# Patient Record
Sex: FEMALE | Race: BLACK OR AFRICAN AMERICAN | Employment: OTHER | URBAN - METROPOLITAN AREA
[De-identification: names, ages, dates, MRNs, and addresses within clinical notes are randomized per-mention and may not be internally consistent; named-entity substitution may affect disease eponyms.]

---

## 2017-02-08 ENCOUNTER — HOSPITAL ENCOUNTER (EMERGENCY)
Age: 32
Discharge: HOME | End: 2017-02-08
Attending: FAMILY MEDICINE

## 2017-02-08 VITALS
OXYGEN SATURATION: 100 % | WEIGHT: 228.6 LBS | SYSTOLIC BLOOD PRESSURE: 138 MMHG | HEIGHT: 62 IN | DIASTOLIC BLOOD PRESSURE: 79 MMHG | RESPIRATION RATE: 18 BRPM | BODY MASS INDEX: 42.07 KG/M2 | TEMPERATURE: 98.6 F | HEART RATE: 100 BPM

## 2017-02-08 DIAGNOSIS — R68.89 FLU-LIKE SYMPTOMS: Primary | ICD-10-CM

## 2017-02-08 LAB
INFLUENZA A AG (POC): NORMAL
INFLUENZA AG (POC) NEGATIVE CTRL.: NORMAL
INFLUENZA AG (POC) POSITIVE CTRL.: NORMAL
INFLUENZA B AG (POC): NORMAL
LOT NUMBER POC: NORMAL

## 2017-02-08 RX ORDER — OSELTAMIVIR PHOSPHATE 75 MG/1
75 CAPSULE ORAL 2 TIMES DAILY
Qty: 10 CAP | Refills: 0 | Status: SHIPPED | OUTPATIENT
Start: 2017-02-08 | End: 2017-02-13

## 2017-02-08 RX ORDER — CODEINE PHOSPHATE AND GUAIFENESIN 10; 100 MG/5ML; MG/5ML
5 SOLUTION ORAL
Qty: 118 ML | Refills: 0 | Status: SHIPPED | OUTPATIENT
Start: 2017-02-08 | End: 2022-09-23

## 2017-02-08 NOTE — UC PROVIDER NOTE
Patient is a 33 y.o. female presenting with cough. The history is provided by the patient. No  was used. Cough   This is a new problem. The current episode started yesterday. The problem occurs constantly. The cough is productive of purulent sputum. Patient reports a subjective fever - was not measured. The fever has been present for less than 1 day. Associated symptoms include chills and rhinorrhea. Pertinent negatives include no shortness of breath, no nausea and no vomiting. She has tried nothing for the symptoms. The treatment provided no relief. She is not a smoker. Her past medical history does not include bronchitis, pneumonia or asthma. Past Medical History   Diagnosis Date    Abnormal Pap smear     Asthma      last attack     Breast lump 2016     right breast lump and tenderness-has enlarged in last month    Psychiatric problem      depression 10yrs ago no meds now        Past Surgical History   Procedure Laterality Date    Hx other surgical       colposcopy     Hx gyn  2010     D&C, molar pregnancy    Hx dilation and curettage  ,,     Hx  section           Family History   Problem Relation Age of Onset    Hypertension Mother    Birder Davidon Elevated Lipids Mother     Heart Disease Mother     Cancer Father     Diabetes Father     Stroke Father     Heart Disease Father     Heart Disease Maternal Grandmother     Stroke Maternal Grandmother     Cancer Maternal Grandfather     Heart Disease Maternal Grandfather         Social History     Social History    Marital status:      Spouse name: N/A    Number of children: N/A    Years of education: N/A     Occupational History    Not on file.      Social History Main Topics    Smoking status: Never Smoker    Smokeless tobacco: Never Used    Alcohol use No    Drug use: No    Sexual activity: Yes     Partners: Male     Birth control/ protection: None     Other Topics Concern    Not on file     Social History Narrative                ALLERGIES: Review of patient's allergies indicates no known allergies. Review of Systems   Constitutional: Positive for chills. HENT: Positive for congestion, postnasal drip, rhinorrhea and sinus pressure. Eyes: Negative. Respiratory: Positive for cough. Negative for shortness of breath. Cardiovascular: Negative. Gastrointestinal: Negative. Negative for nausea and vomiting. Endocrine: Negative. Genitourinary: Negative. Musculoskeletal: Negative. Skin: Negative. Allergic/Immunologic: Negative. Neurological: Negative. Hematological: Negative. Psychiatric/Behavioral: Negative. Vitals:    02/08/17 1057   BP: 138/79   Pulse: 100   Resp: 18   Temp: 98.6 °F (37 °C)   SpO2: 100%   Weight: 103.7 kg (228 lb 9.6 oz)   Height: 5' 1.5\" (1.562 m)       Physical Exam   Constitutional: She is oriented to person, place, and time. She appears well-developed and well-nourished. HENT:   Head: Normocephalic and atraumatic. Right Ear: External ear normal.   Left Ear: External ear normal.   Nose: Nose normal.   Mouth/Throat: Oropharynx is clear and moist.   Eyes: Conjunctivae and EOM are normal. Pupils are equal, round, and reactive to light. Neck: Normal range of motion. Neck supple. Cardiovascular: Normal rate, regular rhythm and normal heart sounds. Pulmonary/Chest: Effort normal and breath sounds normal.   Musculoskeletal: Normal range of motion. Neurological: She is alert and oriented to person, place, and time. Skin: Skin is warm and dry. Psychiatric: She has a normal mood and affect. Her behavior is normal. Judgment and thought content normal.   Nursing note and vitals reviewed. MDM     Differential Diagnosis; Clinical Impression; Plan:     CLINICAL IMPRESSION:  Flu-like symptoms  (primary encounter diagnosis)    Plan:  1. Flu like illness- stay home rest and drink plenty of fluids  2. Tamiflu for flu  3.  Follow up with your PCP as needed  Amount and/or Complexity of Data Reviewed:   Clinical lab tests:  Ordered and reviewed  Risk of Significant Complications, Morbidity, and/or Mortality:   Presenting problems: Moderate  Diagnostic procedures:   Moderate  Progress:   Patient progress:  Stable      Procedures

## 2018-05-09 ENCOUNTER — HOSPITAL ENCOUNTER (OUTPATIENT)
Dept: RADIATION THERAPY | Age: 33
Discharge: HOME | End: 2018-05-09
Payer: COMMERCIAL

## 2018-05-09 PROCEDURE — 77280 THER RAD SIMULAJ FIELD SMPL: CPT

## 2018-05-16 ENCOUNTER — HOSPITAL ENCOUNTER (OUTPATIENT)
Dept: RADIATION THERAPY | Age: 33
Discharge: HOME | End: 2018-05-16
Payer: COMMERCIAL

## 2018-05-16 PROCEDURE — 77280 THER RAD SIMULAJ FIELD SMPL: CPT

## 2018-05-16 PROCEDURE — 77300 RADIATION THERAPY DOSE PLAN: CPT

## 2018-05-16 PROCEDURE — 77412 RADIATION TX DELIVERY LVL 3: CPT

## 2018-05-16 PROCEDURE — 77334 RADIATION TREATMENT AID(S): CPT

## 2018-05-17 ENCOUNTER — HOSPITAL ENCOUNTER (OUTPATIENT)
Dept: RADIATION THERAPY | Age: 33
Discharge: HOME | End: 2018-05-17
Payer: COMMERCIAL

## 2018-05-17 PROCEDURE — 77412 RADIATION TX DELIVERY LVL 3: CPT

## 2018-05-18 ENCOUNTER — HOSPITAL ENCOUNTER (OUTPATIENT)
Dept: RADIATION THERAPY | Age: 33
Discharge: HOME | End: 2018-05-18
Payer: COMMERCIAL

## 2018-05-18 PROCEDURE — 77412 RADIATION TX DELIVERY LVL 3: CPT

## 2018-05-21 ENCOUNTER — HOSPITAL ENCOUNTER (OUTPATIENT)
Dept: RADIATION THERAPY | Age: 33
Discharge: HOME | End: 2018-05-21
Payer: COMMERCIAL

## 2018-11-04 ENCOUNTER — APPOINTMENT (OUTPATIENT)
Dept: GENERAL RADIOLOGY | Age: 33
End: 2018-11-04
Attending: EMERGENCY MEDICINE
Payer: COMMERCIAL

## 2018-11-04 ENCOUNTER — HOSPITAL ENCOUNTER (EMERGENCY)
Age: 33
Discharge: HOME | End: 2018-11-05
Attending: EMERGENCY MEDICINE | Admitting: EMERGENCY MEDICINE
Payer: COMMERCIAL

## 2018-11-04 VITALS
OXYGEN SATURATION: 100 % | TEMPERATURE: 98.3 F | RESPIRATION RATE: 20 BRPM | DIASTOLIC BLOOD PRESSURE: 96 MMHG | HEART RATE: 97 BPM | HEIGHT: 62 IN | SYSTOLIC BLOOD PRESSURE: 161 MMHG | BODY MASS INDEX: 44.91 KG/M2 | WEIGHT: 244.05 LBS

## 2018-11-04 DIAGNOSIS — R07.89 ATYPICAL CHEST PAIN: Primary | ICD-10-CM

## 2018-11-04 LAB
ALBUMIN SERPL-MCNC: 3.9 G/DL (ref 3.5–5)
ALBUMIN/GLOB SERPL: 0.9 {RATIO} (ref 1.1–2.2)
ALP SERPL-CCNC: 90 U/L (ref 45–117)
ALT SERPL-CCNC: 22 U/L (ref 12–78)
ANION GAP SERPL CALC-SCNC: 7 MMOL/L (ref 5–15)
AST SERPL-CCNC: 17 U/L (ref 15–37)
BASOPHILS # BLD: 0 K/UL (ref 0–0.1)
BASOPHILS NFR BLD: 1 % (ref 0–1)
BILIRUB SERPL-MCNC: 0.2 MG/DL (ref 0.2–1)
BNP SERPL-MCNC: 47 PG/ML (ref 0–125)
BUN SERPL-MCNC: 14 MG/DL (ref 6–20)
BUN/CREAT SERPL: 12 (ref 12–20)
CALCIUM SERPL-MCNC: 9 MG/DL (ref 8.5–10.1)
CHLORIDE SERPL-SCNC: 104 MMOL/L (ref 97–108)
CO2 SERPL-SCNC: 26 MMOL/L (ref 21–32)
CREAT SERPL-MCNC: 1.2 MG/DL (ref 0.55–1.02)
D DIMER PPP FEU-MCNC: 0.28 MG/L FEU (ref 0–0.65)
DIFFERENTIAL METHOD BLD: NORMAL
EOSINOPHIL # BLD: 0.1 K/UL (ref 0–0.4)
EOSINOPHIL NFR BLD: 1 % (ref 0–7)
ERYTHROCYTE [DISTWIDTH] IN BLOOD BY AUTOMATED COUNT: 12.9 % (ref 11.5–14.5)
GLOBULIN SER CALC-MCNC: 4.4 G/DL (ref 2–4)
GLUCOSE SERPL-MCNC: 69 MG/DL (ref 65–100)
HCT VFR BLD AUTO: 36.9 % (ref 35–47)
HGB BLD-MCNC: 12.4 G/DL (ref 11.5–16)
IMM GRANULOCYTES # BLD: 0 K/UL (ref 0–0.04)
IMM GRANULOCYTES NFR BLD AUTO: 0 % (ref 0–0.5)
LIPASE SERPL-CCNC: 113 U/L (ref 73–393)
LYMPHOCYTES # BLD: 3.2 K/UL (ref 0.8–3.5)
LYMPHOCYTES NFR BLD: 42 % (ref 12–49)
MCH RBC QN AUTO: 29.5 PG (ref 26–34)
MCHC RBC AUTO-ENTMCNC: 33.6 G/DL (ref 30–36.5)
MCV RBC AUTO: 87.6 FL (ref 80–99)
MONOCYTES # BLD: 0.4 K/UL (ref 0–1)
MONOCYTES NFR BLD: 6 % (ref 5–13)
NEUTS SEG # BLD: 3.8 K/UL (ref 1.8–8)
NEUTS SEG NFR BLD: 50 % (ref 32–75)
NRBC # BLD: 0 K/UL (ref 0–0.01)
NRBC BLD-RTO: 0 PER 100 WBC
PLATELET # BLD AUTO: 268 K/UL (ref 150–400)
PMV BLD AUTO: 9.8 FL (ref 8.9–12.9)
POTASSIUM SERPL-SCNC: 3.5 MMOL/L (ref 3.5–5.1)
PROT SERPL-MCNC: 8.3 G/DL (ref 6.4–8.2)
RBC # BLD AUTO: 4.21 M/UL (ref 3.8–5.2)
SODIUM SERPL-SCNC: 137 MMOL/L (ref 136–145)
TROPONIN I SERPL-MCNC: <0.05 NG/ML
WBC # BLD AUTO: 7.5 K/UL (ref 3.6–11)

## 2018-11-04 PROCEDURE — 83880 ASSAY OF NATRIURETIC PEPTIDE: CPT | Performed by: EMERGENCY MEDICINE

## 2018-11-04 PROCEDURE — 80307 DRUG TEST PRSMV CHEM ANLYZR: CPT | Performed by: EMERGENCY MEDICINE

## 2018-11-04 PROCEDURE — 74011250636 HC RX REV CODE- 250/636: Performed by: EMERGENCY MEDICINE

## 2018-11-04 PROCEDURE — 81001 URINALYSIS AUTO W/SCOPE: CPT | Performed by: EMERGENCY MEDICINE

## 2018-11-04 PROCEDURE — 85025 COMPLETE CBC W/AUTO DIFF WBC: CPT | Performed by: EMERGENCY MEDICINE

## 2018-11-04 PROCEDURE — 36415 COLL VENOUS BLD VENIPUNCTURE: CPT | Performed by: EMERGENCY MEDICINE

## 2018-11-04 PROCEDURE — 85379 FIBRIN DEGRADATION QUANT: CPT | Performed by: EMERGENCY MEDICINE

## 2018-11-04 PROCEDURE — 71046 X-RAY EXAM CHEST 2 VIEWS: CPT

## 2018-11-04 PROCEDURE — 93005 ELECTROCARDIOGRAM TRACING: CPT

## 2018-11-04 PROCEDURE — 83690 ASSAY OF LIPASE: CPT | Performed by: EMERGENCY MEDICINE

## 2018-11-04 PROCEDURE — 80053 COMPREHEN METABOLIC PANEL: CPT | Performed by: EMERGENCY MEDICINE

## 2018-11-04 PROCEDURE — 84484 ASSAY OF TROPONIN QUANT: CPT | Performed by: EMERGENCY MEDICINE

## 2018-11-04 PROCEDURE — 99284 EMERGENCY DEPT VISIT MOD MDM: CPT

## 2018-11-04 PROCEDURE — 96374 THER/PROPH/DIAG INJ IV PUSH: CPT

## 2018-11-04 RX ORDER — KETOROLAC TROMETHAMINE 30 MG/ML
15 INJECTION, SOLUTION INTRAMUSCULAR; INTRAVENOUS
Status: COMPLETED | OUTPATIENT
Start: 2018-11-04 | End: 2018-11-04

## 2018-11-04 RX ORDER — FENTANYL CITRATE 50 UG/ML
50 INJECTION, SOLUTION INTRAMUSCULAR; INTRAVENOUS
Status: DISCONTINUED | OUTPATIENT
Start: 2018-11-04 | End: 2018-11-05

## 2018-11-04 RX ORDER — NAPROXEN 500 MG/1
500 TABLET ORAL
Qty: 20 TAB | Refills: 0 | Status: SHIPPED | OUTPATIENT
Start: 2018-11-04

## 2018-11-04 RX ADMIN — KETOROLAC TROMETHAMINE 15 MG: 30 INJECTION, SOLUTION INTRAMUSCULAR at 22:22

## 2018-11-04 NOTE — LETTER
[facility] EMERGENCY DEPT 
[address] [place] [address] [address] [phone] Work/School Note Date: 11/4/2018 To Whom It May concern: 
 
Stanton Pinon was seen and treated today in the emergency room by the following provider(s): 
Attending Provider: Keisha Childs MD.   
 
Stanton Pinon may return to work on 11/6/18.  
 
Sincerely, 
 
 
 
 
Gayle Zuleta MD

## 2018-11-05 LAB
AMPHET UR QL SCN: NEGATIVE
APPEARANCE UR: CLEAR
ATRIAL RATE: 99 BPM
BACTERIA URNS QL MICRO: NEGATIVE /HPF
BARBITURATES UR QL SCN: NEGATIVE
BENZODIAZ UR QL: NEGATIVE
BILIRUB UR QL: NEGATIVE
CALCULATED P AXIS, ECG09: 55 DEGREES
CALCULATED R AXIS, ECG10: 60 DEGREES
CALCULATED T AXIS, ECG11: 25 DEGREES
CANNABINOIDS UR QL SCN: NEGATIVE
COCAINE UR QL SCN: NEGATIVE
COLOR UR: NORMAL
DIAGNOSIS, 93000: NORMAL
DRUG SCRN COMMENT,DRGCM: NORMAL
EPITH CASTS URNS QL MICRO: NORMAL /LPF
GLUCOSE UR STRIP.AUTO-MCNC: NEGATIVE MG/DL
HCG UR QL: NEGATIVE
HGB UR QL STRIP: NEGATIVE
HYALINE CASTS URNS QL MICRO: NORMAL /LPF (ref 0–5)
KETONES UR QL STRIP.AUTO: NEGATIVE MG/DL
LEUKOCYTE ESTERASE UR QL STRIP.AUTO: NEGATIVE
METHADONE UR QL: NEGATIVE
NITRITE UR QL STRIP.AUTO: NEGATIVE
OPIATES UR QL: NEGATIVE
P-R INTERVAL, ECG05: 152 MS
PCP UR QL: NEGATIVE
PH UR STRIP: 7.5 [PH] (ref 5–8)
PROT UR STRIP-MCNC: NEGATIVE MG/DL
Q-T INTERVAL, ECG07: 328 MS
QRS DURATION, ECG06: 76 MS
QTC CALCULATION (BEZET), ECG08: 420 MS
RBC #/AREA URNS HPF: NORMAL /HPF (ref 0–5)
SP GR UR REFRACTOMETRY: 1.01 (ref 1–1.03)
UA: UC IF INDICATED,UAUC: NORMAL
UROBILINOGEN UR QL STRIP.AUTO: 0.2 EU/DL (ref 0.2–1)
VENTRICULAR RATE, ECG03: 99 BPM
WBC URNS QL MICRO: NORMAL /HPF (ref 0–4)

## 2018-11-05 PROCEDURE — 81025 URINE PREGNANCY TEST: CPT

## 2018-11-05 NOTE — DISCHARGE INSTRUCTIONS

## 2018-11-05 NOTE — ED PROVIDER NOTES
EMERGENCY DEPARTMENT HISTORY AND PHYSICAL EXAM  
     
 
Date: 2018 Patient Name: Shaggy Cuenca History of Presenting Illness Chief Complaint Patient presents with  Chest Pain  
  mid-sternal to right sided chest tightness and burning since around   Shortness of Breath FISHER History Provided By: Patient HPI: Shaggy Cuenca is a 29 y.o. female, pmhx asthma / depression, who presents ambulatory to the ED c/o gradually worsening diffuse burning mid-sternal CP with associated intermittent sharp R sided CP that began x 1930 this evening. She reports additional dyspnea on exertion and chest tightness. Pt denies any hx of similar sxs. She denies any recent medications for her current sxs. Pt reports calling her PCP's office at which time she was advised to follow up in the ED. She states she has a hx of asthma, but notes her current sxs feel different from her previous exacerbations. Pt otherwise specifically denies any recent fevers, chills, nausea, vomiting, diarrhea, abd pain, urinary sxs, changes in BM, or headache. PCP: Markus Toussaint MD 
 
Allergies: NKDA PMHx: Significant for migraines, asthma, depression PSHx: Significant for , D&C Social Hx: -tobacco, -EtOH, -Illicit Drugs There are no other complaints, changes, or physical findings at this time. Current Outpatient Medications Medication Sig Dispense Refill  naproxen (NAPROSYN) 500 mg tablet Take 1 Tab by mouth every twelve (12) hours as needed for Pain. 20 Tab 0  
 guaiFENesin-codeine (ROBITUSSIN AC) 100-10 mg/5 mL solution Take 5 mL by mouth three (3) times daily as needed for Cough. Max Daily Amount: 15 mL. 118 mL 0  
 albuterol (PROAIR HFA) 90 mcg/actuation inhaler Take 1 Puff by inhalation every four (4) hours as needed for Wheezing.     
 albuterol sulfate (PROVENTIL;VENTOLIN) 2.5 mg/0.5 mL nebu nebulizer solution 2.5 mg by Nebulization route every four (4) hours as needed for Wheezing. Past History Past Medical History: 
Past Medical History:  
Diagnosis Date  Abnormal Pap smear  Asthma   
 last attack 2009  Breast lump 2016  
 right breast lump and tenderness-has enlarged in last month  Psychiatric problem   
 depression 10yrs ago no meds now Past Surgical History: 
Past Surgical History:  
Procedure Laterality Date  HX  SECTION    
 HX DILATION AND CURETTAGE  [id number],[id number], 2012  HX GYN  2010  
 D&C, molar pregnancy  HX OTHER SURGICAL    
 colposcopy  Family History: 
Family History Problem Relation Age of Onset  Hypertension Mother  Elevated Lipids Mother  Heart Disease Mother  Cancer Father  Diabetes Father  Stroke Father  Heart Disease Father  Heart Disease Maternal Grandmother  Stroke Maternal Grandmother  Cancer Maternal Grandfather  Heart Disease Maternal Grandfather Social History: 
Social History Tobacco Use  Smoking status: Never Smoker  Smokeless tobacco: Never Used Substance Use Topics  Alcohol use: No  
 Drug use: No  
 
 
Allergies: 
No Known Allergies Review of Systems Review of Systems Constitutional: Negative. Negative for fever. Eyes: Negative. Respiratory: Positive for chest tightness and shortness of breath. Cardiovascular: Positive for chest pain. Gastrointestinal: Negative for abdominal pain, nausea and vomiting. Endocrine: Negative. Genitourinary: Negative. Negative for difficulty urinating, dysuria and hematuria. Musculoskeletal: Negative. Skin: Negative. Allergic/Immunologic: Negative. Neurological: Negative. Psychiatric/Behavioral: Negative for suicidal ideas. All other systems reviewed and are negative. Physical Exam  
Physical Exam  
Constitutional: She is oriented to person, place, and time. She appears well-developed and well-nourished. No distress.   
HENT:  
 Head: Normocephalic and atraumatic. Nose: Nose normal.  
Eyes: Conjunctivae and EOM are normal. No scleral icterus. Neck: Normal range of motion. No tracheal deviation present. Cardiovascular: Normal rate, regular rhythm, normal heart sounds and intact distal pulses. Exam reveals no friction rub. No murmur heard. Pulmonary/Chest: Effort normal and breath sounds normal. No stridor. No respiratory distress. She has no wheezes. She has no rales. She exhibits no tenderness and no bony tenderness. Pt able to speak in full, unlabored sentences. Abdominal: Soft. Bowel sounds are normal. She exhibits no distension. There is no tenderness. There is no rebound. Musculoskeletal: Normal range of motion. She exhibits no tenderness. Neurological: She is alert and oriented to person, place, and time. No cranial nerve deficit. Skin: Skin is warm and dry. No rash noted. She is not diaphoretic. Psychiatric: She has a normal mood and affect. Her speech is normal and behavior is normal. Judgment and thought content normal. Cognition and memory are normal.  
Nursing note and vitals reviewed. Diagnostic Study Results Labs - Recent Results (from the past 12 hour(s)) EKG, 12 LEAD, INITIAL Collection Time: 11/04/18  8:59 PM  
Result Value Ref Range Ventricular Rate 99 BPM  
 Atrial Rate 99 BPM  
 P-R Interval 152 ms QRS Duration 76 ms  
 Q-T Interval 328 ms QTC Calculation (Bezet) 420 ms Calculated P Axis 55 degrees Calculated R Axis 60 degrees Calculated T Axis 25 degrees Diagnosis Normal sinus rhythm Normal ECG When compared with ECG of 29-SEP-2014 13:08, No significant change was found CBC WITH AUTOMATED DIFF Collection Time: 11/04/18  9:10 PM  
Result Value Ref Range WBC 7.5 3.6 - 11.0 K/uL  
 RBC 4.21 3.80 - 5.20 M/uL  
 HGB 12.4 11.5 - 16.0 g/dL HCT 36.9 35.0 - 47.0 %  MCV 87.6 80.0 - 99.0 FL  
 MCH 29.5 26.0 - 34.0 PG  
 MCHC 33.6 30.0 - 36.5 g/dL  
 RDW 12.9 11.5 - 14.5 % PLATELET 450 534 - 484 K/uL MPV 9.8 8.9 - 12.9 FL  
 NRBC 0.0 0  WBC ABSOLUTE NRBC 0.00 0.00 - 0.01 K/uL NEUTROPHILS 50 32 - 75 % LYMPHOCYTES 42 12 - 49 % MONOCYTES 6 5 - 13 % EOSINOPHILS 1 0 - 7 % BASOPHILS 1 0 - 1 % IMMATURE GRANULOCYTES 0 0.0 - 0.5 % ABS. NEUTROPHILS 3.8 1.8 - 8.0 K/UL  
 ABS. LYMPHOCYTES 3.2 0.8 - 3.5 K/UL  
 ABS. MONOCYTES 0.4 0.0 - 1.0 K/UL  
 ABS. EOSINOPHILS 0.1 0.0 - 0.4 K/UL  
 ABS. BASOPHILS 0.0 0.0 - 0.1 K/UL  
 ABS. IMM. GRANS. 0.0 0.00 - 0.04 K/UL  
 DF AUTOMATED METABOLIC PANEL, COMPREHENSIVE Collection Time: 11/04/18  9:10 PM  
Result Value Ref Range Sodium 137 136 - 145 mmol/L Potassium 3.5 3.5 - 5.1 mmol/L Chloride 104 97 - 108 mmol/L  
 CO2 26 21 - 32 mmol/L Anion gap 7 5 - 15 mmol/L Glucose 69 65 - 100 mg/dL BUN 14 6 - 20 MG/DL Creatinine 1.20 (H) 0.55 - 1.02 MG/DL  
 BUN/Creatinine ratio 12 12 - 20 GFR est AA >60 >60 ml/min/1.73m2 GFR est non-AA 52 (L) >60 ml/min/1.73m2 Calcium 9.0 8.5 - 10.1 MG/DL Bilirubin, total 0.2 0.2 - 1.0 MG/DL  
 ALT (SGPT) 22 12 - 78 U/L  
 AST (SGOT) 17 15 - 37 U/L Alk. phosphatase 90 45 - 117 U/L Protein, total 8.3 (H) 6.4 - 8.2 g/dL Albumin 3.9 3.5 - 5.0 g/dL Globulin 4.4 (H) 2.0 - 4.0 g/dL A-G Ratio 0.9 (L) 1.1 - 2.2 D DIMER Collection Time: 11/04/18  9:10 PM  
Result Value Ref Range D-dimer 0.28 0.00 - 0.65 mg/L FEU  
LIPASE Collection Time: 11/04/18  9:10 PM  
Result Value Ref Range Lipase 113 73 - 393 U/L  
NT-PRO BNP Collection Time: 11/04/18  9:10 PM  
Result Value Ref Range NT pro-BNP 47 0 - 125 PG/ML  
TROPONIN I Collection Time: 11/04/18  9:10 PM  
Result Value Ref Range Troponin-I, Qt. <0.05 <0.05 ng/mL URINALYSIS W/ REFLEX CULTURE Collection Time: 11/04/18 11:57 PM  
Result Value Ref Range Color YELLOW/STRAW  Appearance CLEAR CLEAR    
 Specific gravity 1.012 1.003 - 1.030    
 pH (UA) 7.5 5.0 - 8.0 Protein NEGATIVE  NEG mg/dL Glucose NEGATIVE  NEG mg/dL Ketone NEGATIVE  NEG mg/dL Bilirubin NEGATIVE  NEG Blood NEGATIVE  NEG Urobilinogen 0.2 0.2 - 1.0 EU/dL Nitrites NEGATIVE  NEG Leukocyte Esterase NEGATIVE  NEG    
 WBC 0-4 0 - 4 /hpf  
 RBC 0-5 0 - 5 /hpf Epithelial cells FEW FEW /lpf Bacteria NEGATIVE  NEG /hpf  
 UA:UC IF INDICATED CULTURE NOT INDICATED BY UA RESULT CNI Hyaline cast 0-2 0 - 5 /lpf  
HCG URINE, QL. - POC Collection Time: 11/05/18 12:04 AM  
Result Value Ref Range Pregnancy test,urine (POC) NEGATIVE  NEG Radiologic Studies - CXR Results  (Last 48 hours) 11/04/18 2125  XR CHEST PA LAT Final result Impression:  IMPRESSION:  
No acute process. Narrative:  INDICATION:   cp/sob COMPARISON: November 18, 2015 FINDINGS:  
   
Frontal and lateral views of the chest demonstrate a normal cardiomediastinal  
silhouette. The lungs are adequately expanded. There is no edema, effusion,  
consolidation, or pneumothorax. The osseous structures are unremarkable. Medical Decision Making I am the first provider for this patient. I reviewed the vital signs, available nursing notes, past medical history, past surgical history, family history and social history. Vital Signs-Reviewed the patient's vital signs. Patient Vitals for the past 12 hrs: 
 Temp Pulse Resp BP SpO2  
11/04/18 2104 98.3 °F (36.8 °C) 97 20 (!) 161/96 100 % Records Reviewed: Nursing Notes and Old Medical Records Provider Notes (Medical Decision Making): DDX: 
Pna, pe, acs, arrhythmia, muscle spasm, uri, bronchitis Plan: 
Labs, cxr, ua, uds, ekg, analgesic Impression: 
Atypical chest pain ED Course:  
Initial assessment performed.  The patients presenting problems have been discussed, and they are in agreement with the care plan formulated and outlined with them. I have encouraged them to ask questions as they arise throughout their visit. I reviewed our electronic medical record system for any past medical records that were available that may contribute to the patients current condition, the nursing notes and and vital signs from today's visit Nursing notes will be reviewed as they become available in realtime while the pt has been in the ED. Edwar Carrion MD 
 
EKG interpretation 2059: NSR, nl Axis, rate 99; , QRS 76, QTc 420; no acute ischemia; Edwar Carrion MD 
 
I personally reviewed pt's imaging. Official read by radiology noted above. Edwar Carrion MD 
 
PROGRESS NOTE: 
10:54 PM 
Pt states her sxs are improved at this time. Updated pt and family on all available lab and imaging findings at this time. Will continue to monitor. Written by Gasper Hung, ED Scribe, as dictated by Edwar Carrion MD 
 
PROGRESS NOTE: 
12:00 AM 
Pt resting comfortable at this time, stating her pain is improved, but not resolved. Pt notes she is driving herself home; will discontinue order for dose of Fentanyl. Offered to provide pt with paper rx to  tonight or tomorrow morning. Pt defers at this time. Written by Gasper Hung, ZAC Scribe, as dictated by Edwar Carrion MD 
 
12:12 AM 
Progress note: 
Pt noted to be ready for discharge. Discussed lab and imaging findings with pt and/or family, specifically noting otherwise unremarkable w/u. Pt will follow up as instructed. All questions have been answered, pt voiced understanding and agreement with plan. If narcotics were prescribed, pt was advised not to drive or operate heavy machinery. If abx were prescribed, pt advised that diarrhea and rash are possible side effects of the medications.   
 
Specific return precautions provided in addition to instructions for pt to return to the ED immediately should sx worsen at any time. Juana Bernheim, MD 
 
 
Critical Care Time:  
 
none Diagnosis Clinical Impression: 1. Atypical chest pain PLAN: 
1. Current Discharge Medication List  
  
START taking these medications Details  
naproxen (NAPROSYN) 500 mg tablet Take 1 Tab by mouth every twelve (12) hours as needed for Pain. Qty: 20 Tab, Refills: 0  
  
  
 
2. Follow-up Information Follow up With Specialties Details Why Contact Info Joanna Saab MD Internal Medicine Schedule an appointment as soon as possible for a visit in 2 days  [address] [facility] 
[phone] Lina Gilford, MD Cardiology, [address] [facility] Vascular Surgery, Internal Medicine Schedule an appointment as soon as possible for a visit in 2 days  [id number] [facility] 
[phone] Return to ED if worse Disposition: 
 
DISCHARGE NOTE: 
12:13 AM 
The patient's results have been reviewed with family and/or caregiver. They verbally convey their understanding and agreement of the patient's signs, symptoms, diagnosis, treatment, and prognosis. They additionally agree to follow up as recommended in the discharge instructions or to return to the Emergency Room should the patient's condition change prior to their follow-up appointment. The family and/or caregiver verbally agrees with the care-plan and all of their questions have been answered. The discharge instructions have also been provided to the them along with educational information regarding the patient's diagnosis and a list of reasons why the patient would want to return to the ER prior to their follow-up appointment should their condition change. Written by ZAC Siddiqui, as dictated by Juana Bernheim, MD. Attestations:  
 
This note is prepared by Ciera Harvey, acting as Juana Bernheim, MD 
 
 Denia Christensen MD : The scribe's documentation has been prepared under my direction and personally reviewed by me in its entirety. I confirm that the note above accurately reflects all work, treatment, procedures, and medical decision making performed by me. This note will not be viewable in [address]

## 2019-10-17 ENCOUNTER — HOSPITAL ENCOUNTER (OUTPATIENT)
Dept: MAMMOGRAPHY | Age: 34
Discharge: HOME | End: 2019-10-17
Attending: NURSE PRACTITIONER
Payer: COMMERCIAL

## 2019-10-17 DIAGNOSIS — N63.10 MASS OF BREAST, RIGHT: ICD-10-CM

## 2019-10-17 DIAGNOSIS — N63.10 LUMP OF RIGHT BREAST: ICD-10-CM

## 2019-10-17 PROCEDURE — 77065 DX MAMMO INCL CAD UNI: CPT

## 2019-10-17 PROCEDURE — 76642 ULTRASOUND BREAST LIMITED: CPT

## 2021-08-27 ENCOUNTER — TRANSCRIBE ORDER (OUTPATIENT)
Dept: SCHEDULING | Age: 36
End: 2021-08-27

## 2021-08-27 DIAGNOSIS — E22.1 HYPERPROLACTINEMIA (HCC): Primary | ICD-10-CM

## 2021-09-07 ENCOUNTER — HOSPITAL ENCOUNTER (OUTPATIENT)
Dept: CT IMAGING | Age: 36
Discharge: HOME | End: 2021-09-07
Attending: SPECIALIST
Payer: MEDICAID

## 2021-09-07 DIAGNOSIS — E22.1 HYPERPROLACTINEMIA (HCC): ICD-10-CM

## 2021-09-07 PROCEDURE — 70470 CT HEAD/BRAIN W/O & W/DYE: CPT

## 2021-09-07 PROCEDURE — 74011000636 HC RX REV CODE- 636: Performed by: SPECIALIST

## 2021-09-07 RX ADMIN — IOPAMIDOL 100 ML: 612 INJECTION, SOLUTION INTRAVENOUS at 15:43

## 2021-09-16 ENCOUNTER — TRANSCRIBE ORDER (OUTPATIENT)
Dept: SCHEDULING | Age: 36
End: 2021-09-16

## 2021-09-17 ENCOUNTER — TRANSCRIBE ORDER (OUTPATIENT)
Dept: SCHEDULING | Age: 36
End: 2021-09-17

## 2021-09-17 DIAGNOSIS — E22.1 IDIOPATHIC HYPERPROLACTINEMIA (HCC): Primary | ICD-10-CM

## 2021-09-29 ENCOUNTER — HOSPITAL ENCOUNTER (OUTPATIENT)
Dept: MRI IMAGING | Age: 36
Discharge: HOME | End: 2021-09-29
Attending: SPECIALIST
Payer: MEDICAID

## 2021-09-29 VITALS — WEIGHT: 244 LBS | BODY MASS INDEX: 44.63 KG/M2

## 2021-09-29 DIAGNOSIS — E22.1 IDIOPATHIC HYPERPROLACTINEMIA (HCC): ICD-10-CM

## 2021-09-29 PROCEDURE — 70552 MRI BRAIN STEM W/DYE: CPT

## 2021-09-29 PROCEDURE — 74011250636 HC RX REV CODE- 250/636: Performed by: SPECIALIST

## 2021-09-29 PROCEDURE — A9575 INJ GADOTERATE MEGLUMI 0.1ML: HCPCS | Performed by: SPECIALIST

## 2021-09-29 RX ORDER — GADOTERATE MEGLUMINE 376.9 MG/ML
20 INJECTION INTRAVENOUS ONCE
Status: COMPLETED | OUTPATIENT
Start: 2021-09-29 | End: 2021-09-29

## 2021-09-29 RX ADMIN — GADOTERATE MEGLUMINE 20 ML: 376.9 INJECTION INTRAVENOUS at 10:31

## 2022-02-17 ENCOUNTER — OFFICE VISIT (OUTPATIENT)
Dept: INTERNAL MEDICINE CLINIC | Age: 37
End: 2022-02-17
Payer: MEDICAID

## 2022-02-17 VITALS
HEART RATE: 88 BPM | OXYGEN SATURATION: 99 % | WEIGHT: 275 LBS | BODY MASS INDEX: 50.61 KG/M2 | HEIGHT: 62 IN | DIASTOLIC BLOOD PRESSURE: 85 MMHG | RESPIRATION RATE: 18 BRPM | SYSTOLIC BLOOD PRESSURE: 136 MMHG | TEMPERATURE: 98.4 F

## 2022-02-17 DIAGNOSIS — E66.01 CLASS 3 SEVERE OBESITY DUE TO EXCESS CALORIES WITH SERIOUS COMORBIDITY AND BODY MASS INDEX (BMI) OF 50.0 TO 59.9 IN ADULT (HCC): ICD-10-CM

## 2022-02-17 DIAGNOSIS — Z76.89 ESTABLISHING CARE WITH NEW DOCTOR, ENCOUNTER FOR: Primary | ICD-10-CM

## 2022-02-17 DIAGNOSIS — J45.20 MILD INTERMITTENT ASTHMA WITHOUT COMPLICATION: ICD-10-CM

## 2022-02-17 PROCEDURE — 99203 OFFICE O/P NEW LOW 30 MIN: CPT | Performed by: FAMILY MEDICINE

## 2022-02-17 NOTE — PROGRESS NOTES
Chief Complaint   Patient presents with   [facility] Establish Care     establish care before weightloss surgery     1. Have you been to the ER, urgent care clinic since your last visit? Hospitalized since your last visit? No    2. Have you seen or consulted any other health care providers outside of the [address] [place] [address] since your last visit? Include any pap smears or colon screening.  No  Visit Vitals  /85   Pulse 88   Temp 98.4 °F (36.9 °C) (Oral)   Resp 18   Ht 5' 1.5\" (1.562 m)   Wt 275 lb (124.7 kg)   SpO2 99%   BMI 51.12 kg/m²

## 2022-02-17 NOTE — PROGRESS NOTES
SPORTS MEDICINE AND PRIMARY CARE  Evangelista Butler. MD Kimberly  [address] [address]    Chief Complaint   Patient presents with   [facility]     establish care before weightloss surgery       SUBJECTIVE:    Perla Alamo is a 40 y.o. female for care establishment. Patient is preparing for a vertical gastric sleeve. Past medical history significant for mild intermittent asthma and class III obesity. Obesity effects breathing. Normal sleep study 1 month ago. Care gaps:  covid vaccine x2, pfizer  Hep c screening -   Pneumonia vaccine- no history  Pap smear-  normal  Flu vaccine= 2021      Current Outpatient Medications   Medication Sig Dispense Refill    albuterol (PROAIR HFA) 90 mcg/actuation inhaler Take 1 Puff by inhalation every four (4) hours as needed for Wheezing.  albuterol sulfate (PROVENTIL;VENTOLIN) 2.5 mg/0.5 mL nebu nebulizer solution 2.5 mg by Nebulization route every four (4) hours as needed for Wheezing.  naproxen (NAPROSYN) 500 mg tablet Take 1 Tab by mouth every twelve (12) hours as needed for Pain. (Patient not taking: Reported on 2022) 20 Tab 0    guaiFENesin-codeine (ROBITUSSIN AC) 100-10 mg/5 mL solution Take 5 mL by mouth three (3) times daily as needed for Cough. Max Daily Amount: 15 mL.  (Patient not taking: Reported on 2022) 118 mL 0     Past Medical History:   Diagnosis Date    Abnormal Pap smear     Asthma     last attack 2009    Breast lump 2016    right breast lump and tenderness-has enlarged in last month    Psychiatric problem     depression 10yrs ago no meds now     Past Surgical History:   Procedure Laterality Date    HX  SECTION      HX DILATION AND CURETTAGE  ,, 2012    HX GYN  2010    D&C, molar pregnancy    HX OTHER SURGICAL      colposcopy      No Known Allergies    REVIEW OF SYSTEMS:  General: negative for - chills or fever  ENT: negative for - headaches, nasal congestion, tinnitus, hearing loss, vision changes, sore throat  Respiratory:  shortness of breath; negative for - cough, hemoptysis, or wheezing  Cardiovascular : shortness of breath; negative for - chest pain, edema, palpitations   Gastrointestinal: negative for - abdominal pain, blood in stools, heartburn or nausea/vomiting, diarrhea, constipation  Genito-Urinary: no dysuria, trouble voiding, hematuria or erectile dysfunction  Musculoskeletal: negative for - gait disturbance, joint pain, joint stiffness , joint swelling, muscle aches  Neurological: no TIA or stroke symptoms  Hematologic: no bruises, no bleeding, no swollen glands  Integument: no lumps, mole changes, nail changes or rash  Endocrine:no malaise/lethargy or unexpected weight changes      Social History     Socioeconomic History    Marital status:    Tobacco Use    Smoking status: Never Smoker    Smokeless tobacco: Never Used   Substance and Sexual Activity    Alcohol use: No    Drug use: No    Sexual activity: Yes     Partners: Male     Birth control/protection: None     Family History   Problem Relation Age of Onset    Hypertension Mother     Elevated Lipids Mother     Heart Disease Mother     Cancer Father     Diabetes Father     Stroke Father     Heart Disease Father     Heart Disease Maternal Grandmother     Stroke Maternal Grandmother     Cancer Maternal Grandfather     Heart Disease Maternal Grandfather        OBJECTIVE:     Visit Vitals  /85   Pulse 88   Temp 98.4 °F (36.9 °C) (Oral)   Resp 18   Ht 5' 1.5\" (1.562 m)   Wt 275 lb (124.7 kg)   LMP 02/03/2022 (Exact Date)   SpO2 99%   BMI 51.12 kg/m²     CONSTITUTIONAL: Cooperative, no acute distress  EYES:  eom intact  ENMT:moist mucous membranes,  NECK: supple. Cor:rrr  RESPIRATORY: Chest: clear bilaterally  INTEGUMENT: warm and dry  NEUROLOGIC: non-focal exam   MENTAL STATUS: alert ,appropriate affect     ASSESSMENT/PLAN:  1. Establishing care with new doctor, encounter for    2. Class 3 severe obesity due to excess calories with serious comorbidity and body mass index (BMI) of 50.0 to 59.9 in adult ([facility] [address]    3. Mild intermittent asthma without complication    RTO 4 wk for weight check    . No orders of the defined types were placed in this encounter. I have discussed the diagnosis with the patient and the intended plan as seen in the  orders above. The patient understands and agrees with the plan. The patient has   received an after visit summary. Questions were answered concerning  future plans  Patient labs and/or xrays were reviewed as available. Past records were reviewed as available. Counseled regarding  healthy lifestyle          Advised patient to call back or return to office if symptoms develop/worsen/change/persist.  Discussed expected course/resolution/complications of diagnosis in detail with patient. Neha Coy M.D. This note was created using voice recognition software.   Edits have been made but syntax errors might exist.

## 2022-03-02 ENCOUNTER — TELEPHONE (OUTPATIENT)
Dept: INTERNAL MEDICINE CLINIC | Age: 37
End: 2022-03-02

## 2022-03-02 NOTE — TELEPHONE ENCOUNTER
----- Message from Rosibel Sweeney sent at 3/2/2022  9:43 AM EST -----  Subject: Message to Provider    QUESTIONS  Information for Provider? Patient called back to check on whether or not   her medical records were received at your office. They should have been   sent over about a week ago. Please advise patient. Thank you.  ---------------------------------------------------------------------------  --------------  CALL BACK INFO  What is the best way for the office to contact you? OK to leave message on   voicemail  Preferred Call Back Phone Number? [phone]  ---------------------------------------------------------------------------  --------------  SCRIPT ANSWERS  Relationship to Patient?  Self

## 2022-09-23 ENCOUNTER — OFFICE VISIT (OUTPATIENT)
Dept: INTERNAL MEDICINE CLINIC | Age: 37
End: 2022-09-23
Payer: MEDICAID

## 2022-09-23 VITALS
DIASTOLIC BLOOD PRESSURE: 84 MMHG | RESPIRATION RATE: 16 BRPM | OXYGEN SATURATION: 97 % | HEART RATE: 79 BPM | WEIGHT: 218.1 LBS | SYSTOLIC BLOOD PRESSURE: 121 MMHG | BODY MASS INDEX: 40.14 KG/M2 | HEIGHT: 62 IN | TEMPERATURE: 97.7 F

## 2022-09-23 DIAGNOSIS — Z00.00 WELL ADULT EXAM: Primary | ICD-10-CM

## 2022-09-23 PROCEDURE — 99395 PREV VISIT EST AGE 18-39: CPT | Performed by: FAMILY MEDICINE

## 2022-09-23 RX ORDER — BISMUTH SUBSALICYLATE 262 MG
1 TABLET,CHEWABLE ORAL DAILY
COMMUNITY

## 2022-09-23 RX ORDER — IBUPROFEN 200 MG
CAPSULE ORAL DAILY
COMMUNITY

## 2022-09-23 NOTE — PROGRESS NOTES
SPORTS MEDICINE [facility]  Pina Huerta. MD Kimberly  [address]  [id number]    Chief Complaint   Patient presents with    Physical       SUBJECTIVE:    Albino Schaeffer is a 40 y.o. female for CPE. Status post Eric en y gastric bypass 22    Past medical medical history of mild intermittent asthma-stable; obesity-preop weight was 275 pounds. Patient is down to 218lb. Declines blood work. LMP : 22  Last Pap (q 3 years, or q5 with HPV) :   Hx of abnl Pap - No  Last Mammogram (50-74 biennially):  na  Hx abnl mammogram:    Contraception:none    1. Do you follow a low fat diet? yes  2. Are you up to date on your Tdap (<10 years)? unsure  3. Have you ever had a Pneumovax vaccine (>65)  - no              PCV13               PPSV23   4. Have you had Zoster vaccine (>50)? na  5. Have you had the HPV - Gardasil (13- 46)? yes   6. Have you had a hepatitis C virus screening test?no  7. Do you smoke?  no  8. Do you consider yourself overweight?  yes  9. Is there a family history of CAD< age 48? yes  8. Is there a family history of Cancer?  yes  11. Do you know your Cancer risks? yes  12. Have you had a colonoscopy? (45-76yo)? Na   13. Have you been tested for HIV or other STI's?  (18-69 y/o)? yes  14. Have had a bone density scan or DEXA done(>65)? 15.  Have you had an EKG performed in the last five years (>50)? 16. exercise- yes    Current Outpatient Medications   Medication Sig Dispense Refill    multivitamin (ONE A DAY) tablet Take 1 Tablet by mouth daily. calcium citrate 200 mg (950 mg) tablet Take  by mouth daily. albuterol sulfate (PROVENTIL;VENTOLIN) 2.5 mg/0.5 mL nebu nebulizer solution 2.5 mg by Nebulization route every four (4) hours as needed for Wheezing. albuterol (PROVENTIL HFA, VENTOLIN HFA, PROAIR HFA) 90 mcg/actuation inhaler Take 1 Puff by inhalation every four (4) hours as needed for Wheezing or Shortness of Breath.  18 g 5 naproxen (NAPROSYN) 500 mg tablet Take 1 Tab by mouth every twelve (12) hours as needed for Pain.  (Patient not taking: No sig reported) 20 Tab 0     Past Medical History:   Diagnosis Date    Abnormal Pap smear     Asthma     last attack 2009    Breast lump 2016    right breast lump and tenderness-has enlarged in last month    Psychiatric problem     depression 10yrs ago no meds now     Past Surgical History:   Procedure Laterality Date    HX  SECTION      HX DILATION AND CURETTAGE  ,,     HX GYN  2010    D&C, molar pregnancy    HX OTHER SURGICAL      colposcopy      No Known Allergies    REVIEW OF SYSTEMS:  General: negative for - chills or fever  ENT: negative for - headaches, nasal congestion, tinnitus, hearing loss, vision changes, sore throat  Respiratory: negative for - cough, hemoptysis, shortness of breath or wheezing  Cardiovascular : negative for - chest pain, edema, palpitations or shortness of breath  Gastrointestinal: negative for - abdominal pain, blood in stools, heartburn or nausea/vomiting, diarrhea, constipation  Genito-Urinary: no dysuria, trouble voiding, hematuria   Musculoskeletal: negative for - gait disturbance, joint pain, joint stiffness , joint swelling, muscle aches  Neurological: no TIA or stroke symptoms  Hematologic: no bruises, no bleeding, no swollen glands  Integument: no lumps, mole changes, nail changes or rash  Endocrine:no malaise/lethargy or unexpected weight changes      Social History     Socioeconomic History    Marital status:    Tobacco Use    Smoking status: Never    Smokeless tobacco: Never   Vaping Use    Vaping Use: Never used   Substance and Sexual Activity    Alcohol use: No    Drug use: No    Sexual activity: Yes     Partners: Male     Birth control/protection: None     Family History   Problem Relation Age of Onset    Hypertension Mother     Elevated Lipids Mother     Heart Disease Mother     Cancer Father     Diabetes Father Stroke Father     Heart Disease Father     Heart Disease Maternal Grandmother     Stroke Maternal Grandmother     Cancer Maternal Grandfather     Heart Disease Maternal Grandfather        OBJECTIVE:     Visit Vitals  /84 (BP 1 Location: Left upper arm, BP Patient Position: Sitting, BP Cuff Size: Large adult)   Pulse 79   Temp 97.7 °F (36.5 °C)   Resp 16   Ht 5' 1.5\" (1.562 m)   Wt 218 lb 1.6 oz (98.9 kg)   SpO2 97%   BMI 40.54 kg/m²   General appearance: alert, cooperative, no distress, appears stated age  Head: Normocephalic, without obvious abnormality, atraumatic  Eyes: conjunctivae/corneas clear. PERRL, EOM's intact. Ears: normal TM's and external ear canals AU  Nose: Nares normal. Septum midline. Mucosa normal. No drainage or sinus tenderness. Throat: Lips, mucosa, and tongue normal. Teeth and gums normal  Neck: supple, symmetrical, trachea midline, no adenopathy, thyroid: not enlarged, symmetric, no tenderness/mass/nodules, no carotid bruit and no JVD  Back: negative, symmetric, no curvature. ROM normal. No CVA tenderness. Lungs: clear to auscultation bilaterally  Breasts: not examined  Heart: regular rate and rhythm, S1, S2 normal, no murmur, click, rub or gallop  Abdomen: soft, non-tender. Bowel sounds normal. No masses,  no organomegaly  Pelvic: deferred  Extremities: extremities normal, atraumatic, no cyanosis or edema  Pulses: 2+ and symmetric  Skin: Skin color, texture, turgor normal. No rashes or lesions  Lymph nodes: Cervical, supraclavicular, and axillary nodes normal.  Neurologic: Alert and oriented X 3, normal strength and tone. Normal symmetric reflexes. Normal coordination and gait       ASSESSMENT/ PLAN:  1.  Well adult exam    .  Orders Placed This Encounter    multivitamin (ONE A DAY) tablet    calcium citrate 200 mg (950 mg) tablet       Follow-up and Dispositions    Return in about 1 year (around 9/23/2023) for annual wellness exam.         I have discussed the diagnosis with the patient and the intended plan as seen in the  orders above. The patient understands and agrees with the plan. The patient has   received an after visit summary. Questions were answered concerning  future plans  Patient labs and/or xrays were reviewed as available. Past records were reviewed as available. Counseled regarding diet, exercise and healthy lifestyle       Signed,  Miguel Queen M.D. This note was created using voice recognition software.   Edits have been made but syntax errors might exist.

## 2022-09-23 NOTE — PROGRESS NOTES
Flory Villagomez is a 40 y.o. female    Chief Complaint   Patient presents with    Physical       Visit Vitals  /84 (BP 1 Location: Left upper arm, BP Patient Position: Sitting, BP Cuff Size: Large adult)   Pulse 79   Temp 97.7 °F (36.5 °C)   Resp 16   Ht 5' 1.5\" (1.562 m)   Wt 218 lb 1.6 oz (98.9 kg)   SpO2 97%   BMI 40.54 kg/m²           1. Have you been to the ER, urgent care clinic since your last visit? Hospitalized since your last visit? NO    2. Have you seen or consulted any other health care providers outside of the [address] [place] [address] since your last visit? Include any pap smears or colon screening.  NO

## 2022-10-05 RX ORDER — ALBUTEROL SULFATE 90 UG/1
1 AEROSOL, METERED RESPIRATORY (INHALATION)
Qty: 18 G | Refills: 5 | Status: SHIPPED | OUTPATIENT
Start: 2022-10-05

## 2022-10-05 NOTE — TELEPHONE ENCOUNTER
PCP: Cristel Meyers MD    Last appt: 9/23/2022  No future appointments. Requested Prescriptions     Pending Prescriptions Disp Refills    albuterol (PROVENTIL HFA, VENTOLIN HFA, PROAIR HFA) 90 mcg/actuation inhaler 18 g      Sig: Take 1 Puff by inhalation every four (4) hours as needed for Wheezing or Shortness of Breath.        Prior labs and Blood pressures:  BP Readings from Last 3 Encounters:   09/23/22 121/84   02/17/22 136/85   11/04/18 (!) 161/96     Lab Results   Component Value Date/Time    Sodium 137 11/04/2018 09:10 PM    Potassium 3.5 11/04/2018 09:10 PM    Chloride 104 11/04/2018 09:10 PM    CO2 26 11/04/2018 09:10 PM    Anion gap 7 11/04/2018 09:10 PM    Glucose 69 11/04/2018 09:10 PM    BUN 14 11/04/2018 09:10 PM    Creatinine 1.20 (H) 11/04/2018 09:10 PM    BUN/Creatinine ratio 12 11/04/2018 09:10 PM    GFR est AA >60 11/04/2018 09:10 PM    GFR est non-AA 52 (L) 11/04/2018 09:10 PM    Calcium 9.0 11/04/2018 09:10 PM

## 2023-01-15 ENCOUNTER — HOSPITAL ENCOUNTER (EMERGENCY)
Dept: MRI IMAGING | Age: 38
Discharge: HOME | End: 2023-01-15
Attending: EMERGENCY MEDICINE
Payer: MEDICAID

## 2023-01-15 ENCOUNTER — HOSPITAL ENCOUNTER (EMERGENCY)
Age: 38
Discharge: HOME | End: 2023-01-16
Attending: EMERGENCY MEDICINE
Payer: MEDICAID

## 2023-01-15 ENCOUNTER — APPOINTMENT (OUTPATIENT)
Dept: CT IMAGING | Age: 38
End: 2023-01-15
Attending: EMERGENCY MEDICINE
Payer: MEDICAID

## 2023-01-15 VITALS — BODY MASS INDEX: 36.07 KG/M2 | WEIGHT: 190.92 LBS

## 2023-01-15 DIAGNOSIS — R20.0 LEFT ARM NUMBNESS: Primary | ICD-10-CM

## 2023-01-15 DIAGNOSIS — R51.9 NONINTRACTABLE EPISODIC HEADACHE, UNSPECIFIED HEADACHE TYPE: ICD-10-CM

## 2023-01-15 LAB
ALBUMIN SERPL-MCNC: 3.4 G/DL (ref 3.5–5)
ALBUMIN/GLOB SERPL: 0.9 (ref 1.1–2.2)
ALP SERPL-CCNC: 94 U/L (ref 45–117)
ALT SERPL-CCNC: 28 U/L (ref 12–78)
ANION GAP SERPL CALC-SCNC: 9 MMOL/L (ref 5–15)
AST SERPL-CCNC: 24 U/L (ref 15–37)
BASOPHILS # BLD: 0 K/UL (ref 0–0.1)
BASOPHILS NFR BLD: 1 % (ref 0–1)
BILIRUB SERPL-MCNC: 0.2 MG/DL (ref 0.2–1)
BUN SERPL-MCNC: 11 MG/DL (ref 6–20)
BUN/CREAT SERPL: 12 (ref 12–20)
CALCIUM SERPL-MCNC: 8.8 MG/DL (ref 8.5–10.1)
CHLORIDE SERPL-SCNC: 103 MMOL/L (ref 97–108)
CO2 SERPL-SCNC: 26 MMOL/L (ref 21–32)
CREAT SERPL-MCNC: 0.94 MG/DL (ref 0.55–1.02)
DIFFERENTIAL METHOD BLD: ABNORMAL
EOSINOPHIL # BLD: 0.1 K/UL (ref 0–0.4)
EOSINOPHIL NFR BLD: 2 % (ref 0–7)
ERYTHROCYTE [DISTWIDTH] IN BLOOD BY AUTOMATED COUNT: 15.7 % (ref 11.5–14.5)
GLOBULIN SER CALC-MCNC: 3.9 G/DL (ref 2–4)
GLUCOSE SERPL-MCNC: 87 MG/DL (ref 65–100)
HCT VFR BLD AUTO: 27.2 % (ref 35–47)
HGB BLD-MCNC: 8.4 G/DL (ref 11.5–16)
IMM GRANULOCYTES # BLD AUTO: 0 K/UL (ref 0–0.04)
IMM GRANULOCYTES NFR BLD AUTO: 0 % (ref 0–0.5)
LYMPHOCYTES # BLD: 2 K/UL (ref 0.8–3.5)
LYMPHOCYTES NFR BLD: 53 % (ref 12–49)
MCH RBC QN AUTO: 24.3 PG (ref 26–34)
MCHC RBC AUTO-ENTMCNC: 30.9 G/DL (ref 30–36.5)
MCV RBC AUTO: 78.8 FL (ref 80–99)
MONOCYTES # BLD: 0.3 K/UL (ref 0–1)
MONOCYTES NFR BLD: 8 % (ref 5–13)
NEUTS SEG # BLD: 1.4 K/UL (ref 1.8–8)
NEUTS SEG NFR BLD: 36 % (ref 32–75)
NRBC # BLD: 0 K/UL (ref 0–0.01)
NRBC BLD-RTO: 0 PER 100 WBC
PLATELET # BLD AUTO: 281 K/UL (ref 150–400)
PMV BLD AUTO: 9.3 FL (ref 8.9–12.9)
POTASSIUM SERPL-SCNC: 4.2 MMOL/L (ref 3.5–5.1)
PROT SERPL-MCNC: 7.3 G/DL (ref 6.4–8.2)
RBC # BLD AUTO: 3.45 M/UL (ref 3.8–5.2)
SODIUM SERPL-SCNC: 138 MMOL/L (ref 136–145)
WBC # BLD AUTO: 3.8 K/UL (ref 3.6–11)

## 2023-01-15 PROCEDURE — 70496 CT ANGIOGRAPHY HEAD: CPT

## 2023-01-15 PROCEDURE — 80053 COMPREHEN METABOLIC PANEL: CPT

## 2023-01-15 PROCEDURE — 70553 MRI BRAIN STEM W/O & W/DYE: CPT

## 2023-01-15 PROCEDURE — 99285 EMERGENCY DEPT VISIT HI MDM: CPT

## 2023-01-15 PROCEDURE — 72156 MRI NECK SPINE W/O & W/DYE: CPT

## 2023-01-15 PROCEDURE — 70450 CT HEAD/BRAIN W/O DYE: CPT

## 2023-01-15 PROCEDURE — 74011250636 HC RX REV CODE- 250/636: Performed by: EMERGENCY MEDICINE

## 2023-01-15 PROCEDURE — 74011000636 HC RX REV CODE- 636

## 2023-01-15 PROCEDURE — 96374 THER/PROPH/DIAG INJ IV PUSH: CPT

## 2023-01-15 PROCEDURE — 85025 COMPLETE CBC W/AUTO DIFF WBC: CPT

## 2023-01-15 PROCEDURE — 93005 ELECTROCARDIOGRAM TRACING: CPT

## 2023-01-15 PROCEDURE — A9576 INJ PROHANCE MULTIPACK: HCPCS | Performed by: EMERGENCY MEDICINE

## 2023-01-15 PROCEDURE — 36415 COLL VENOUS BLD VENIPUNCTURE: CPT

## 2023-01-15 RX ORDER — KETOROLAC TROMETHAMINE 30 MG/ML
30 INJECTION, SOLUTION INTRAMUSCULAR; INTRAVENOUS ONCE
Status: COMPLETED | OUTPATIENT
Start: 2023-01-15 | End: 2023-01-15

## 2023-01-15 RX ADMIN — SODIUM CHLORIDE 1000 ML: 9 INJECTION, SOLUTION INTRAVENOUS at 23:48

## 2023-01-15 RX ADMIN — GADOTERIDOL 17 ML: 279.3 INJECTION, SOLUTION INTRAVENOUS at 23:11

## 2023-01-15 RX ADMIN — KETOROLAC TROMETHAMINE 30 MG: 30 INJECTION, SOLUTION INTRAMUSCULAR; INTRAVENOUS at 23:47

## 2023-01-15 RX ADMIN — IOPAMIDOL 100 ML: 755 INJECTION, SOLUTION INTRAVENOUS at 20:51

## 2023-01-16 VITALS
TEMPERATURE: 98.5 F | HEIGHT: 61 IN | BODY MASS INDEX: 36.06 KG/M2 | HEART RATE: 88 BPM | OXYGEN SATURATION: 100 % | WEIGHT: 191 LBS | SYSTOLIC BLOOD PRESSURE: 123 MMHG | DIASTOLIC BLOOD PRESSURE: 82 MMHG | RESPIRATION RATE: 18 BRPM

## 2023-01-16 LAB
ATRIAL RATE: 77 BPM
CALCULATED P AXIS, ECG09: 57 DEGREES
CALCULATED R AXIS, ECG10: 57 DEGREES
CALCULATED T AXIS, ECG11: 36 DEGREES
DIAGNOSIS, 93000: NORMAL
P-R INTERVAL, ECG05: 150 MS
Q-T INTERVAL, ECG07: 378 MS
QRS DURATION, ECG06: 72 MS
QTC CALCULATION (BEZET), ECG08: 427 MS
VENTRICULAR RATE, ECG03: 77 BPM

## 2023-01-16 NOTE — ED TRIAGE NOTES
Pt arrives POV and ambulatory c/o tingling in left arm int for 2 weeks. Pt reports she has moments of confusion and bilat leg tingling. Pt reports multiple episodes today onset 1100. CSS neg.

## 2023-01-16 NOTE — ED PROVIDER NOTES
Patient is a 44-year-old woman with a history of migraine who comes into the emergency department with numbness and tingling to the left arm and difficulty concentrating. Patient reports that over the last 2 to 3 weeks she has had intermittent episodes of left arm numbness and tingling, a burning sensation in the left arm, difficulty concentrating and increased forgetfulness. The episodes come and go and she had an episode this morning at [employer] that lasted approximately 10 minutes before resolving spontaneously. Approximately an hour and 15 minutes prior to her evaluation she had another episode that has also resolved. Sometimes the episodes are associated with headache, often the episodes are associated with global fatigue. Her father  of a stroke at age 61. The history is provided by the patient.       Past Medical History:   Diagnosis Date    Abnormal Pap smear     Asthma     last attack     Breast lump 2016    right breast lump and tenderness-has enlarged in last month    Psychiatric problem     depression 10yrs ago no meds now       Past Surgical History:   Procedure Laterality Date    HX  SECTION      HX DILATION AND CURETTAGE  ,,     HX GYN  2010    D&C, molar pregnancy    HX OTHER SURGICAL      colposcopy          Family History:   Problem Relation Age of Onset    Hypertension Mother     Elevated Lipids Mother     Heart Disease Mother     Cancer Father     Diabetes Father     Stroke Father     Heart Disease Father     Heart Disease Maternal Grandmother     Stroke Maternal Grandmother     Cancer Maternal Grandfather     Heart Disease Maternal Grandfather        Social History     Socioeconomic History    Marital status:      Spouse name: Not on file    Number of children: Not on file    Years of education: Not on file    Highest education level: Not on file   Occupational History    Not on file   Tobacco Use    Smoking status: Never    Smokeless tobacco: Never   Vaping Use    Vaping Use: Never used   Substance and Sexual Activity    Alcohol use: No    Drug use: No    Sexual activity: Yes     Partners: Male     Birth control/protection: None   Other Topics Concern    Not on file   Social History Narrative    Not on file     Social Determinants of Health     Financial Resource Strain: Not on file   Food Insecurity: Not on file   Transportation Needs: Not on file   Physical Activity: Not on file   Stress: Not on file   Social Connections: Not on file   Intimate Partner Violence: Not on file   Housing Stability: Not on file         ALLERGIES: Patient has no known allergies. Review of Systems   Constitutional:  Positive for fatigue. Neurological:  Positive for numbness and headaches. All other systems reviewed and are negative. Vitals:    01/15/23 2002   BP: 136/76   Pulse: 88   Resp: 18   Temp: 98.5 °F (36.9 °C)   SpO2: 100%   Weight: 86.6 kg (191 lb)   Height: 5' 1\" (1.549 m)            Physical Exam  Vitals and nursing note reviewed. Constitutional:       Appearance: She is well-developed. She is not ill-appearing. HENT:      Head: Normocephalic and atraumatic. Eyes:      General: No scleral icterus. Cardiovascular:      Rate and Rhythm: Normal rate. Pulmonary:      Effort: Pulmonary effort is normal.   Abdominal:      General: There is no distension. Musculoskeletal:      Cervical back: Normal range of motion. Skin:     General: Skin is warm and dry. Findings: No erythema or rash. Neurological:      General: No focal deficit present. Mental Status: She is alert and oriented to person, place, and time. Cranial Nerves: No cranial nerve deficit. Sensory: No sensory deficit. Motor: No weakness. Psychiatric:         Mood and Affect: Mood normal.         Speech: Speech is not slurred. Behavior: Behavior is slowed. Medical Decision Making  Amount and/or Complexity of Data Reviewed  Labs: ordered.   Radiology: ordered. ECG/medicine tests: ordered. Risk  Prescription drug management. ED Course as of 01/15/23 2031   Lee Virgen Henry 15, 2023   2010 EKG at 8:09 PM shows normal sinus rhythm, 77 bpm, normal axis, normal intervals, no ST changes, no T wave changes, no ectopy [IO]      ED Course User Index  [IO] Clem Tony MD       Procedures      The patient presented to the emergency department with a headache. The patient is now resting comfortably and feels better, is alert, talkative, interactive and in no distress. The patient appears well and is able to tolerate PO fluids. The repeat examination is unremarkable and benign. The patient is neurologically intact, has a normal mental status, and is ambulatory in the ED. The history, exam, diagnostic testing (if any) and the patient's current condition do not suggest meningitis, stroke, sepsis, subarachnoid hemorrhage, intracranial bleeding, encephalitis, temporal arteritis, multiple sclerosis, cervical radiculopathy, or other significant pathology to warrant further testing, continued ED treatment, admission, neurological consultation, or other specialist evaluation at this point. The vital signs have been stable. The patient's condition is stable and appropriate for discharge. The patient will pursue further outpatient evaluation with the neurologist as indicated in the discharge instructions. MEDICATIONS GIVEN:  Medications   iopamidoL (ISOVUE-370) 370 mg iodine /mL (76 %) injection 100 mL (100 mL IntraVENous Given 1/15/23 2051)   sodium chloride 0.9 % bolus infusion 1,000 mL (0 mL IntraVENous IV Completed 1/16/23 0036)   ketorolac (TORADOL) injection 30 mg (30 mg IntraVENous Given 1/15/23 2347)       IMPRESSION:  1. Left arm numbness    2.  Nonintractable episodic headache, unspecified headache type

## 2023-04-19 ENCOUNTER — OFFICE VISIT (OUTPATIENT)
Dept: NEUROLOGY | Age: 38
End: 2023-04-19
Payer: MEDICAID

## 2023-04-19 VITALS
HEART RATE: 80 BPM | DIASTOLIC BLOOD PRESSURE: 80 MMHG | RESPIRATION RATE: 16 BRPM | OXYGEN SATURATION: 97 % | SYSTOLIC BLOOD PRESSURE: 122 MMHG | TEMPERATURE: 97.7 F

## 2023-04-19 DIAGNOSIS — R47.9 DIFFICULTY WITH SPEECH: Primary | ICD-10-CM

## 2023-04-19 DIAGNOSIS — R20.2 NUMBNESS AND TINGLING OF LEFT ARM AND LEG: ICD-10-CM

## 2023-04-19 DIAGNOSIS — R45.89 FLAT AFFECT: ICD-10-CM

## 2023-04-19 DIAGNOSIS — R20.0 NUMBNESS AND TINGLING OF LEFT ARM AND LEG: ICD-10-CM

## 2023-04-19 PROCEDURE — 99204 OFFICE O/P NEW MOD 45 MIN: CPT | Performed by: PSYCHIATRY & NEUROLOGY

## 2023-04-19 NOTE — PROGRESS NOTES
Altagracia Bauer (1985) is a 41 y.o. female, new patient, here for evaluation of the following     Chief complaint(s):   Chief Complaint   Patient presents with    New Patient     Patient presents today        HPI: 41 y.o. female      Self referred    ER Note 1-: Patient is a 52-year-old woman with a history of migraine who comes into the emergency department with numbness and tingling to the left arm and difficulty concentrating. Patient reports that over the last 2 to 3 weeks she has had intermittent episodes of left arm numbness and tingling, a burning sensation in the left arm, difficulty concentrating and increased forgetfulness. The episodes come and go and she had an episode this morning at [employer] that lasted approximately 10 minutes before resolving spontaneously. Approximately an hour and 15 minutes prior to her evaluation she had another episode that has also resolved. Sometimes the episodes are associated with headache, often the episodes are associated with global fatigue. Her father  of a stroke at age 61. CTA Head/ Neck (1-15-23): No large vessel occlusion, hemodynamically significant carotid stenosis, or evidence of vasculitis. MRI Brain +/- contrast (1-15-23): The ventricles are normal in size and position. The brain parenchyma has normal signal characteristics. Incidental partially empty sella. There is no acute infarct, hemorrhage, extra-axial fluid collection, or mass effect. There is no cerebellar tonsillar herniation. Expected arterial flow-voids are present. No evidence of abnormal enhancement. Small retention cysts in the bilateral sphenoid sinuses. The mastoid air cells and middle ears are clear. The orbital contents are within normal limits. No significant osseous or scalp lesions are identified. IMPRESSION  1. No acute or significant intracranial abnormality. MRI C-spine +/- contrast (1-15-23): 1. Normal cervical cord. No abnormal enhancement.  No acute abnormality. 2. Mild degenerative disc disease at C5-C6 and C6-C7. No significant spinal canal or neural foraminal stenosis    Starting around Sept-Oct 2022, after 2nd episode of Covid-19:     1) Patient reports having recurring episodes where she can't get words out lasting 45 seconds to 1-2 minutes, sometimes knows what's going on around her, sometimes doesn't). If not sleeping enough, can have them 2-3 times a day for several days in a row. If she gets normal amount of sleep (7-8 hours), she might have 1-2 episodes a week. No hx of HTN, HLD, DM, Smoking, DVT/ PEs, but does report she has had 4 unexplained miscarriages. She reports that Ob checked blood work after each miscarriage to look into cause and no lab abnormality was identified. 2) Separately has episodes of tingling in left arm or leg, that around same timeframe as above. May happen with the speech episodes or may happen separate from them. 3) Separately has hx of Migraine for about 10 years. Pt told ER provider that sometimes episodes are associated with headache. I tried to inquire about the relationship between her spells and headache but she flatly refused to answer questions about headache (frequency, relation to current complaint)      She cannot identify any major life stressor, change in stressors before she started having the above episodes    No personal hx or FHx of seizure that she's aware of      Review of Systems: Anxiety, frequent headaches, neuropathy, seizures, vision problems, fatigue     ==================================================    No Known Allergies    Current Outpatient Medications   Medication Sig Dispense Refill    albuterol (PROVENTIL HFA, VENTOLIN HFA, PROAIR HFA) 90 mcg/actuation inhaler Take 1 Puff by inhalation every four (4) hours as needed for Wheezing or Shortness of Breath. 18 g 5    multivitamin (ONE A DAY) tablet Take 1 Tablet by mouth daily.       calcium citrate 200 mg (950 mg) tablet Take  by mouth daily. albuterol sulfate (PROVENTIL;VENTOLIN) 2.5 mg/0.5 mL nebu nebulizer solution 0.5 mL by Nebulization route every four (4) hours as needed for Wheezing. Past Medical History:   Diagnosis Date    Abnormal Pap smear     Asthma     last attack 2009    Breast lump 2016    right breast lump and tenderness-has enlarged in last month    Psychiatric problem     depression 10yrs ago no meds now       Past Surgical History:   Procedure Laterality Date    HX  SECTION      HX DILATION AND CURETTAGE  ,,     HX GYN  2010    D&C, molar pregnancy    HX OTHER SURGICAL      colposcopy        family history includes Cancer in her father and maternal grandfather; Diabetes in her father; Elevated Lipids in her mother; Heart Disease in her father, maternal grandfather, maternal grandmother, and mother; Hypertension in her mother; Stroke in her father and maternal grandmother. reports that she has never smoked. She has never used smokeless tobacco. She reports that she does not drink alcohol and does not use drugs. PHYSICAL EXAM    Vitals:    23 1314   BP: 122/80   Pulse: 80   Temp: 97.7 °F (36.5 °C)   Resp: 16   SpO2: 97%     Awake alert resting calm  Flat affect  EOMI, visual fields normal on both sides  Normal hearing and speech  Face is symmetric  Intact light touch on both sides of face  No papilledema on either side  Shoulder shrug is symmetric  No resting postural or intention tremors  Normal strength in all extremities  Intact light touch, pinprick, temperature, vibration in all extremities  Reflexes are 1+ and symmetric throughout  Gait is normal  Romberg is negative    ==================================================    ASSESSMENT/ PLAN:       ICD-10-CM ICD-9-CM    1. Difficulty with speech  R47.9 784.59 EEG AMB NEURO      2. Numbness and tingling of left arm and leg  R20.0 782. 0 EMG LIMITED    R20.2        3.  Flat affect  R45.89 799.29            Episodes of speech difficulty lasting up to 2 minutes, with or without some degree of cognitive difficulty during the event. Episodes more frequent if she does not get enough sleep. Brain MRI is normal.  Has flat affect, but denies any depression symptoms. Check sleep deprived EEG to evaluate for any underlying seizure discharges. Note intermittent numbness tingling of left arm or left leg: Check EMG of left arm and left leg    Flat affect during today's interview and hx of depression but denies any ongoing depression symptoms: defer to PCP on evaluation/ treatment of depression symptoms    Follow-up in 3 months      An electronic signature was used to authenticate this note.   -- Annette Crenshaw MD

## 2023-05-24 RX ORDER — ALBUTEROL SULFATE 90 UG/1
1 AEROSOL, METERED RESPIRATORY (INHALATION) EVERY 4 HOURS PRN
COMMUNITY
Start: 2022-10-05

## 2023-05-24 RX ORDER — IBUPROFEN 200 MG
CAPSULE ORAL DAILY
COMMUNITY